# Patient Record
Sex: FEMALE | Race: WHITE | NOT HISPANIC OR LATINO | Employment: FULL TIME | URBAN - METROPOLITAN AREA
[De-identification: names, ages, dates, MRNs, and addresses within clinical notes are randomized per-mention and may not be internally consistent; named-entity substitution may affect disease eponyms.]

---

## 2023-08-16 ENCOUNTER — OFFICE VISIT (OUTPATIENT)
Dept: FAMILY MEDICINE CLINIC | Facility: CLINIC | Age: 58
End: 2023-08-16
Payer: COMMERCIAL

## 2023-08-16 VITALS
RESPIRATION RATE: 18 BRPM | HEART RATE: 84 BPM | DIASTOLIC BLOOD PRESSURE: 82 MMHG | TEMPERATURE: 97.1 F | SYSTOLIC BLOOD PRESSURE: 134 MMHG | OXYGEN SATURATION: 95 % | WEIGHT: 226 LBS

## 2023-08-16 DIAGNOSIS — Z00.00 ANNUAL PHYSICAL EXAM: ICD-10-CM

## 2023-08-16 DIAGNOSIS — Z12.11 ENCOUNTER FOR SCREENING COLONOSCOPY: ICD-10-CM

## 2023-08-16 DIAGNOSIS — Z11.59 NEED FOR HEPATITIS C SCREENING TEST: ICD-10-CM

## 2023-08-16 DIAGNOSIS — Z12.31 ENCOUNTER FOR SCREENING MAMMOGRAM FOR BREAST CANCER: Primary | ICD-10-CM

## 2023-08-16 DIAGNOSIS — Z78.0 ASYMPTOMATIC POSTMENOPAUSAL STATE: ICD-10-CM

## 2023-08-16 PROCEDURE — 99386 PREV VISIT NEW AGE 40-64: CPT | Performed by: FAMILY MEDICINE

## 2023-08-16 NOTE — PROGRESS NOTES
[facility]    NAME: Stanton Islas  AGE: 59 y.o. SEX: female  : 1965     DATE: 2023     Assessment and Plan:     Problem List Items Addressed This Visit    None  Visit Diagnoses     Encounter for screening mammogram for breast cancer    -  Primary    Encounter for screening colonoscopy        Relevant Orders    Cologuard    Annual physical exam        Relevant Orders    Lipid panel    CBC and Platelet    Hemoglobin A1C    HIV 1/2 AB/AG w Reflex [facility] for 2 yr old and above    Hepatitis C antibody    Vitamin D 25 hydroxy    Comprehensive metabolic panel    Need for hepatitis C screening test        Relevant Orders    Hepatitis C antibody    Asymptomatic postmenopausal state        Relevant Orders    DXA bone density spine hip and pelvis          Immunizations and preventive care screenings were discussed with patient today. Appropriate education was printed on patient's after visit summary. Counseling:  Alcohol/drug use: discussed moderation in alcohol intake, the recommendations for healthy alcohol use, and avoidance of illicit drug use. Dental Health: discussed importance of regular tooth brushing, flossing, and dental visits. Injury prevention: discussed safety/seat belts, safety helmets, smoke detectors, carbon dioxide detectors, and smoking near bedding or upholstery. Sexual health: discussed sexually transmitted diseases, partner selection, use of condoms, avoidance of unintended pregnancy, and contraceptive alternatives. Exercise: the importance of regular exercise/physical activity was discussed. Recommend exercise 3-5 times per week for at least 30 minutes. Return in 1 year (on 2024).      Chief Complaint:     Chief Complaint   Patient presents with   • New Patient Visit     Established care   • Physical Exam      History of Present Illness:     Adult Annual Physical   Patient here for a comprehensive physical exam. The patient reports no problems. Diet and Physical Activity  Diet/Nutrition: well balanced diet. Exercise: no formal exercise. Depression Screening  PHQ-2/9 Depression Screening    Little interest or pleasure in doing things: 0 - not at all  Feeling down, depressed, or hopeless: 0 - not at all  PHQ-2 Score: 0  PHQ-2 Interpretation: Negative depression screen       General Health  Sleep: sleeps poorly. Hearing: normal - bilateral.  Vision: no vision problems and previous LASIK surgery. Dental: regular dental visits. /GYN Health  Patient is: postmenopausal  Last menstrual period: hysterectomy 15y ago  Contraceptive method: n/a. Review of Systems:     Review of Systems   All other systems reviewed and are negative. Past Medical History:     History reviewed. No pertinent past medical history. Past Surgical History:     Past Surgical History:   Procedure Laterality Date   • HYSTERECTOMY      patient states 15 yrs ago      Social History:     Social History     Socioeconomic History   • Marital status: /Civil Union     Spouse name: None   • Number of children: None   • Years of education: None   • Highest education level: None   Occupational History   • None   Tobacco Use   • Smoking status: Never     Passive exposure: Never   • Smokeless tobacco: Never   Vaping Use   • Vaping Use: Never used   Substance and Sexual Activity   • Alcohol use: Not Currently   • Drug use: Never   • Sexual activity: None   Other Topics Concern   • None   Social History Narrative   • None     Social Determinants of Health     Financial Resource Strain: Not on file   Food Insecurity: Not on file   Transportation Needs: Not on file   Physical Activity: Not on file   Stress: Not on file   Social Connections: Not on file   Intimate Partner Violence: Not on file   Housing Stability: Not on file      Family History:     History reviewed. No pertinent family history.    Current Medications:     No current outpatient medications on file. No current facility-administered medications for this visit. Allergies: Allergies   Allergen Reactions   • Poison Ivy Extract Swelling      Physical Exam:     /82 (BP Location: Left arm, Patient Position: Sitting, Cuff Size: Large)   Pulse 84   Temp (!) 97.1 °F (36.2 °C) (Temporal)   Resp 18   Wt 103 kg (226 lb)   LMP  (LMP Unknown)   SpO2 95%     Physical Exam  Vitals reviewed. Constitutional:       General: She is not in acute distress. Appearance: She is well-developed. HENT:      Head: Normocephalic and atraumatic. Nose: Nose normal.      Mouth/Throat:      Mouth: Mucous membranes are moist.      Pharynx: Oropharynx is clear. Eyes:      Conjunctiva/sclera: Conjunctivae normal.   Cardiovascular:      Rate and Rhythm: Normal rate and regular rhythm. Heart sounds: Normal heart sounds. No murmur heard. Pulmonary:      Effort: Pulmonary effort is normal. No respiratory distress. Breath sounds: Normal breath sounds. Abdominal:      General: Abdomen is flat. Palpations: Abdomen is soft. Tenderness: There is no abdominal tenderness. Musculoskeletal:         General: No swelling. Cervical back: Neck supple. Skin:     General: Skin is warm and dry. Capillary Refill: Capillary refill takes less than 2 seconds. Neurological:      Mental Status: She is alert.    Psychiatric:         Mood and Affect: Mood normal.         Behavior: [place]  [address] [facility],[address]

## 2023-08-30 LAB
EXTERNAL HIV SCREEN: NORMAL
HBA1C MFR BLD HPLC: 5.6 %
HCV AB SER-ACNC: NORMAL

## 2023-09-07 ENCOUNTER — TELEPHONE (OUTPATIENT)
Dept: FAMILY MEDICINE CLINIC | Facility: CLINIC | Age: 58
End: 2023-09-07

## 2023-09-07 DIAGNOSIS — R19.5 POSITIVE COLORECTAL CANCER SCREENING USING COLOGUARD TEST: Primary | ICD-10-CM

## 2023-09-07 LAB — COLOGUARD RESULT REPORTABLE: POSITIVE

## 2023-09-07 NOTE — TELEPHONE ENCOUNTER
----- Message from Ann Murray DO sent at 9/7/2023 10:13 AM EDT -----  Cologuard test was positive which means she needs to have a colonoscopy. I have placed a referral for GI who should be contacting her to schedule.

## 2024-07-10 ENCOUNTER — APPOINTMENT (EMERGENCY)
Dept: CT IMAGING | Facility: HOSPITAL | Age: 59
End: 2024-07-10
Payer: COMMERCIAL

## 2024-07-10 ENCOUNTER — HOSPITAL ENCOUNTER (EMERGENCY)
Facility: HOSPITAL | Age: 59
Discharge: HOME | End: 2024-07-10
Attending: EMERGENCY MEDICINE
Payer: COMMERCIAL

## 2024-07-10 VITALS
DIASTOLIC BLOOD PRESSURE: 69 MMHG | HEART RATE: 66 BPM | TEMPERATURE: 98.1 F | SYSTOLIC BLOOD PRESSURE: 154 MMHG | OXYGEN SATURATION: 98 % | RESPIRATION RATE: 18 BRPM

## 2024-07-10 DIAGNOSIS — R42 VERTIGO: Primary | ICD-10-CM

## 2024-07-10 DIAGNOSIS — R03.0 ELEVATED BLOOD PRESSURE READING: ICD-10-CM

## 2024-07-10 DIAGNOSIS — G44.209 ACUTE NON INTRACTABLE TENSION-TYPE HEADACHE: ICD-10-CM

## 2024-07-10 DIAGNOSIS — R42 DIZZINESS: ICD-10-CM

## 2024-07-10 LAB
ALBUMIN SERPL BCG-MCNC: 4.5 G/DL (ref 3.5–5)
ALP SERPL-CCNC: 62 U/L (ref 34–104)
ALT SERPL W P-5'-P-CCNC: 22 U/L (ref 7–52)
ANION GAP SERPL CALCULATED.3IONS-SCNC: 7 MMOL/L (ref 4–13)
AST SERPL W P-5'-P-CCNC: 15 U/L (ref 13–39)
BASOPHILS # BLD AUTO: 0.02 THOUSANDS/ÂΜL (ref 0–0.1)
BASOPHILS NFR BLD AUTO: 0 % (ref 0–1)
BILIRUB SERPL-MCNC: 0.4 MG/DL (ref 0.2–1)
BUN SERPL-MCNC: 15 MG/DL (ref 5–25)
CALCIUM SERPL-MCNC: 9.8 MG/DL (ref 8.4–10.2)
CARDIAC TROPONIN I PNL SERPL HS: 4 NG/L
CHLORIDE SERPL-SCNC: 103 MMOL/L (ref 96–108)
CO2 SERPL-SCNC: 30 MMOL/L (ref 21–32)
CREAT SERPL-MCNC: 0.71 MG/DL (ref 0.6–1.3)
EOSINOPHIL # BLD AUTO: 0.05 THOUSAND/ÂΜL (ref 0–0.61)
EOSINOPHIL NFR BLD AUTO: 1 % (ref 0–6)
ERYTHROCYTE [DISTWIDTH] IN BLOOD BY AUTOMATED COUNT: 13.1 % (ref 11.6–15.1)
GFR SERPL CREATININE-BSD FRML MDRD: 92 ML/MIN/1.73SQ M
GLUCOSE SERPL-MCNC: 182 MG/DL (ref 65–140)
HCT VFR BLD AUTO: 42.6 % (ref 34.8–46.1)
HGB BLD-MCNC: 13.9 G/DL (ref 11.5–15.4)
IMM GRANULOCYTES # BLD AUTO: 0.05 THOUSAND/UL (ref 0–0.2)
IMM GRANULOCYTES NFR BLD AUTO: 1 % (ref 0–2)
LYMPHOCYTES # BLD AUTO: 1.61 THOUSANDS/ÂΜL (ref 0.6–4.47)
LYMPHOCYTES NFR BLD AUTO: 18 % (ref 14–44)
MCH RBC QN AUTO: 29.1 PG (ref 26.8–34.3)
MCHC RBC AUTO-ENTMCNC: 32.6 G/DL (ref 31.4–37.4)
MCV RBC AUTO: 89 FL (ref 82–98)
MONOCYTES # BLD AUTO: 0.46 THOUSAND/ÂΜL (ref 0.17–1.22)
MONOCYTES NFR BLD AUTO: 5 % (ref 4–12)
NEUTROPHILS # BLD AUTO: 7.03 THOUSANDS/ÂΜL (ref 1.85–7.62)
NEUTS SEG NFR BLD AUTO: 75 % (ref 43–75)
NRBC BLD AUTO-RTO: 0 /100 WBCS
PLATELET # BLD AUTO: 273 THOUSANDS/UL (ref 149–390)
PMV BLD AUTO: 10 FL (ref 8.9–12.7)
POTASSIUM SERPL-SCNC: 3.7 MMOL/L (ref 3.5–5.3)
PROT SERPL-MCNC: 7.7 G/DL (ref 6.4–8.4)
RBC # BLD AUTO: 4.77 MILLION/UL (ref 3.81–5.12)
SODIUM SERPL-SCNC: 140 MMOL/L (ref 135–147)
WBC # BLD AUTO: 9.22 THOUSAND/UL (ref 4.31–10.16)

## 2024-07-10 PROCEDURE — 99284 EMERGENCY DEPT VISIT MOD MDM: CPT

## 2024-07-10 PROCEDURE — 70496 CT ANGIOGRAPHY HEAD: CPT

## 2024-07-10 PROCEDURE — 36415 COLL VENOUS BLD VENIPUNCTURE: CPT

## 2024-07-10 PROCEDURE — 84484 ASSAY OF TROPONIN QUANT: CPT | Performed by: EMERGENCY MEDICINE

## 2024-07-10 PROCEDURE — 70498 CT ANGIOGRAPHY NECK: CPT

## 2024-07-10 PROCEDURE — 80053 COMPREHEN METABOLIC PANEL: CPT

## 2024-07-10 PROCEDURE — 85025 COMPLETE CBC W/AUTO DIFF WBC: CPT

## 2024-07-10 PROCEDURE — 93005 ELECTROCARDIOGRAM TRACING: CPT

## 2024-07-10 PROCEDURE — 99285 EMERGENCY DEPT VISIT HI MDM: CPT | Performed by: EMERGENCY MEDICINE

## 2024-07-10 PROCEDURE — 96374 THER/PROPH/DIAG INJ IV PUSH: CPT

## 2024-07-10 RX ORDER — KETOROLAC TROMETHAMINE 30 MG/ML
15 INJECTION, SOLUTION INTRAMUSCULAR; INTRAVENOUS ONCE
Status: COMPLETED | OUTPATIENT
Start: 2024-07-10 | End: 2024-07-10

## 2024-07-10 RX ORDER — AMLODIPINE BESYLATE 5 MG/1
5 TABLET ORAL ONCE
Status: COMPLETED | OUTPATIENT
Start: 2024-07-10 | End: 2024-07-10

## 2024-07-10 RX ORDER — ACETAMINOPHEN 325 MG/1
650 TABLET ORAL ONCE
Status: COMPLETED | OUTPATIENT
Start: 2024-07-10 | End: 2024-07-10

## 2024-07-10 RX ORDER — AMLODIPINE BESYLATE 5 MG/1
5 TABLET ORAL DAILY
Qty: 30 TABLET | Refills: 0 | Status: SHIPPED | OUTPATIENT
Start: 2024-07-10 | End: 2024-07-17 | Stop reason: SDUPTHER

## 2024-07-10 RX ORDER — MECLIZINE HCL 12.5 MG/1
25 TABLET ORAL ONCE
Status: COMPLETED | OUTPATIENT
Start: 2024-07-10 | End: 2024-07-10

## 2024-07-10 RX ADMIN — ACETAMINOPHEN 650 MG: 325 TABLET, FILM COATED ORAL at 13:23

## 2024-07-10 RX ADMIN — IOHEXOL 70 ML: 350 INJECTION, SOLUTION INTRAVENOUS at 11:27

## 2024-07-10 RX ADMIN — MECLIZINE 25 MG: 12.5 TABLET ORAL at 10:38

## 2024-07-10 RX ADMIN — AMLODIPINE BESYLATE 5 MG: 5 TABLET ORAL at 13:24

## 2024-07-10 RX ADMIN — KETOROLAC TROMETHAMINE 15 MG: 30 INJECTION, SOLUTION INTRAMUSCULAR; INTRAVENOUS at 13:23

## 2024-07-10 NOTE — ED ATTENDING ATTESTATION
7/10/2024  I, Tanvi Colón MD, saw and evaluated the patient. I have discussed the patient with the resident/non-physician practitioner and agree with the resident's/non-physician practitioner's findings, Plan of Care, and MDM as documented in the resident's/non-physician practitioner's note, except where noted. All available labs and Radiology studies were reviewed.  I was present for key portions of any procedure(s) performed by the resident/non-physician practitioner and I was immediately available to provide assistance.       At this point I agree with the current assessment done in the Emergency Department.  I have conducted an independent evaluation of this patient a history and physical is as follows:    59-year-old previously healthy female presenting for evaluation of dizziness, nausea, and headache.  Patient states that she woke up at around midnight lying on her left side.  When she laid back in bed she had sudden onset of sensation of the room spinning that lasted for about 3 minutes after which she ambulated to the bathroom and laid on the cold tile floor.  Over the next couple of hours she had recurrent sensation of the room spinning that would occur whenever she would get up and lie back in bed.  She subsequently developed a gradual in onset moderate in intensity frontal throbbing headache without vision changes, chest pain, shortness of breath, or palpitations.  She denies any focal weakness or numbness, dysarthria, or facial droop.  She is no longer experiencing a sensation of the room spinning but does feel lightheaded with persistent headache.  She has been able to ambulate without assistance.  Denies ringing in her ears or hearing loss.    Physical Exam  Vitals and nursing note reviewed.   Constitutional:       General: She is not in acute distress.     Appearance: She is well-developed. She is not ill-appearing, toxic-appearing or diaphoretic.   HENT:      Head: Normocephalic and  atraumatic.      Right Ear: External ear normal.      Left Ear: External ear normal.      Nose: Nose normal.   Eyes:      Extraocular Movements: Extraocular movements intact.      Conjunctiva/sclera: Conjunctivae normal.      Pupils: Pupils are equal, round, and reactive to light.   Cardiovascular:      Rate and Rhythm: Normal rate and regular rhythm.      Pulses: Normal pulses.      Heart sounds: Normal heart sounds. No murmur heard.     No friction rub. No gallop.   Pulmonary:      Effort: Pulmonary effort is normal. No respiratory distress.      Breath sounds: Normal breath sounds. No wheezing or rales.   Abdominal:      General: Bowel sounds are normal. There is no distension.      Palpations: Abdomen is soft.      Tenderness: There is no abdominal tenderness. There is no guarding.   Musculoskeletal:         General: No deformity. Normal range of motion.      Cervical back: Normal range of motion and neck supple.      Right lower leg: No edema.      Left lower leg: No edema.   Skin:     General: Skin is warm and dry.   Neurological:      Mental Status: She is alert and oriented to person, place, and time.      Motor: No abnormal muscle tone.      Comments: Face symmetric, tongue midline, 5/5 strength in the proximal and distal upper and lower extremities bilaterally with intact sensation to light touch throughout.  CN II-XII intact. Normal finger-to-nose, rapid alternating movements, and heel-to-shin bilaterally.  Normal speech.  When first standing the patient stumbled to the side but then caught herself.  Once her dizziness passed she was able to ambulate back and forth across the exam room 10+ times with a steady gait without assistance.  No ataxia or persistent dizziness.  No pronator drift.      Psychiatric:         Mood and Affect: Mood normal.           ED Course  ED Course as of 07/10/24 1501   Wed Jul 10, 2024   1017 Patient comes in after waking up with dizziness that she describes as a sensation of  room spinning and lightheadedness that occurred when waking up on her left side and then getting up from bed.  The sensation of movement lasted for approximately 3 minutes and then resolved.  She got up from bed, walked into the bathroom, laid on the floor, at which point she developed a gradual in onset moderate in severity headache that is since improved.  She continues to feel lightheaded but denies any further sensation of movement.  She did say that the sensation of movement recurred several times when she would change positions but then would stop after several minutes.  She reports nausea without vomiting.  No vision changes.  She had difficulty walking when she was encountering the symptom of the room spinning but is able to walk without assistance when that feeling is not present.  Denies any hearing loss, ringing in the ears, focal weakness or sensory deficits.   1019 NIH stroke scale is 0 on arrival.  When ambulating, patient was initially unsteady immediately when standing however then is able to ambulate back-and-forth across her exam room 10 times without any unsteadiness or assistance.  She has normal finger-nose, rapid alternating movements, and heel-to-shin bilaterally.  I have a very low suspicion for central etiology of her dizziness at this time as it is episodic with normal neurologic exam so will not activate stroke alert.  As the patient is newly hypertensive, however, will obtain CTA of the head and neck without contrast for further evaluation.  Meclizine to be ordered and will assess for improvement.  Blood pressure is already improved from 194/94 to 182/76 without intervention.  Will hold off on further antihypertensive agents unless pressure begins to trend back up.   1124 I personally interpreted the patient's EKG which reveals normal rate, normal sinus rhythm, left axis deviation, normal intervals, T wave inversion in lead III, nonspecific T wave abnormality in lead V2, no ST segment  deviation.  Patient denies chest pain or shortness of breath.  Troponin obtained in the setting of restratification with hypertension and abnormal EKG that is 4, doubt ACS.   1234 CT of the head and neck with fibromuscular dysplasia to the mid cervical segment of the right ICA, no other acute findings.  Will assess for improvement in symptoms.   1313 Patient feels symptomatically improved, is no longer dizzy or lightheaded but continues to have a 2 out of 10 headache in a bandlike distribution.  BP still elevated to 164/70.  Will start on low-dose amlodipine, 5 mg, administered Tylenol and Toradol.  If headache is improved and she remains otherwise asymptomatic can be discharged with follow-up with her PCP.  Will recommend daily blood pressure check at home, keeping a log of her blood pressures, and following up with her doctor in 1 week for blood pressure recheck and adjustment of her meds at that time if BP remains elevated.  She has no other signs of endorgan damage on workup to suggest hypertensive crisis.  I suspect the episode of sensation of the room spinning that occurred with turning over in bed this morning and quickly resolved was due to positional vertigo, headache and lightheadedness likely in the setting of hypertension.  She is not ataxic with ambulation, neuroexam remains normal, doubt central etiology such as stroke as the cause of her symptoms.   1436 BP improved to 154/69.    1500 Headache improved. Patient discharged in good condition.          Critical Care Time  Procedures

## 2024-07-10 NOTE — ED PROVIDER NOTES
"History  Chief Complaint   Patient presents with    Dizziness     Woke from sleep at midnight with dizziness, headache, n/v. Pt states room spinning. Difficulty walking. Denies hx of vertigo.  LKW 2130.       Dizziness  59-year-old female with no known past medical history presents for 10 hours of dizziness lightheadedness and nausea with emesis.  The patient states that she awoke with the room spinning and her eyes \"beating back-and-forth\" in a horizontal direction.  The patient states that she got up from her bed and then her dizziness improved.  However, she spent the next 3 hours with multiple episodes of vomiting.  The patient notes that she feels mildly unsteady on her feet but is able to ambulate without assistance.  Patient denies previous headache, dizziness or nausea leading up to this episode.    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    None       History reviewed. No pertinent past medical history.    Past Surgical History:   Procedure Laterality Date    HYSTERECTOMY      patient states 15 yrs ago       History reviewed. No pertinent family history.  I have reviewed and agree with the history as documented.    E-Cigarette/Vaping    E-Cigarette Use Never User      E-Cigarette/Vaping Substances    Nicotine No     THC No     CBD No     Flavoring No     Other No     Unknown No      Social History     Tobacco Use    Smoking status: Never     Passive exposure: Never    Smokeless tobacco: Never   Vaping Use    Vaping status: Never Used   Substance Use Topics    Alcohol use: Not Currently    Drug use: Never        Review of Systems   Neurological:  Positive for dizziness.       Physical Exam  ED Triage Vitals   Temperature Pulse Respirations Blood Pressure SpO2   07/10/24 0916 07/10/24 0913 07/10/24 0913 07/10/24 0913 07/10/24 0913   98.1 °F (36.7 °C) 76 18 (!) 194/94 98 %      Temp Source Heart Rate Source Patient Position - Orthostatic " VS BP Location FiO2 (%)   07/10/24 0916 07/10/24 0913 07/10/24 0913 07/10/24 0913 --   Oral Monitor Sitting Right arm       Pain Score       --                    Orthostatic Vital Signs  Vitals:    07/10/24 0913 07/10/24 1000   BP: (!) 194/94 (!) 182/76   Pulse: 76 72   Patient Position - Orthostatic VS: Sitting        Physical Exam  GENERAL APPEARANCE:  AxOx4, generally well-appearing Female, no acute distress.  HEENT:  NC, AT. MMM. EOMI, clear conjunctiva, oropharynx clear.  NECK:  Supple without lymphadenopathy.  No stiffness or restricted ROM.  HEART:  Normal rate and regular rhythm, normal S2, systolic blowing murmur hiding S1.  LUNGS:  CTAB, moving air well. No crackles or wheezes are heard.  ABDOMEN:  Soft, nontender, nondistended with good bowel sounds heard.  BACK: No CVAT, no obvious deformity.  EXTREMITIES:  Without cyanosis, clubbing or edema.  NEUROLOGICAL:  A&OX4. Face symmetric, tongue midline. CN II-XII intact. 5/5 strength in bilateral upper and lower extremities with intact sensation to light touch throughout. Normal finger-to-nose, heel-to-shin, and rapid alternating movements bilaterally. No pronator drift. Normal speech. Initially unsteady, but gait normalized as she ambulated.  Skin:  Warm and dry without any rash.    ED Medications  Medications - No data to display    Diagnostic Studies  Results Reviewed       Procedure Component Value Units Date/Time    CBC and differential [[id number]]     Lab Status: No result Specimen: Blood     Comprehensive metabolic panel [[id number]]     Lab Status: No result Specimen: Blood                    CTA head and neck with and without contrast    (Results Pending)         Procedures  Procedures      ED Course                                       Medical Decision Making  Amount and/or Complexity of Data Reviewed  Labs: ordered.  Radiology: ordered.    59-year-old female with PMH of murmur presents for tender history of dizziness headache lightheadedness and  unsteadiness.  This patient's presentation is most consistent with a peripheral cause like BPPV.  No history of recent infection so doubt vestibular neuritis.  History is also not consistent with Ménière's disease.  No history of trauma or fall.  No red flag features for central vertical to include gradual onset, vertical/bidirectional or nonfatigable nystagmus or focal neurologic findings on exam (including inability to ambulate, ataxia, dysmetria).  Presentation is also not consistent with an acute CNS infection, vestibular basilar artery insufficiency, cerebral hemorrhage or infarction, intracranial mass or bleed.    CT scan showed no sign of any acute intracranial abnormality, but did show signs of fibromuscular dysplasia in the right ICA.    Tylenol, Toradol and meclizine showed improvement in the patient's symptoms.  She also had no unsteadiness on her feet with walking a second time.    Discussed the findings of the CT with the patient and she will be following up with her primary care provider in regards to her CT results and blood pressure.  Will be sending a short course of amlodipine for blood pressure control and will talk to her primary care about continuation of this medication. Counseled the patient to return if any symptoms of severe nausea, severe dizziness or seizures present.      Disposition  Final diagnoses:   None     ED Disposition       None          Follow-up Information    None         Patient's Medications    No medications on file     No discharge procedures on file.    PDMP Review       None             ED Provider  Attending physically available and evaluated Allison Kothari. I managed the patient along with the ED Attending.    Electronically Signed by           Prakash Ureña DO  07/10/24 1414       Prakash Ureña DO  07/10/24 1410

## 2024-07-11 LAB
ATRIAL RATE: 67 BPM
P AXIS: 50 DEGREES
PR INTERVAL: 182 MS
QRS AXIS: 4 DEGREES
QRSD INTERVAL: 80 MS
QT INTERVAL: 388 MS
QTC INTERVAL: 409 MS
T WAVE AXIS: 16 DEGREES
VENTRICULAR RATE: 67 BPM

## 2024-07-11 PROCEDURE — 93010 ELECTROCARDIOGRAM REPORT: CPT | Performed by: INTERNAL MEDICINE

## 2024-07-17 ENCOUNTER — OFFICE VISIT (OUTPATIENT)
Dept: FAMILY MEDICINE CLINIC | Facility: CLINIC | Age: 59
End: 2024-07-17
Payer: COMMERCIAL

## 2024-07-17 VITALS
RESPIRATION RATE: 18 BRPM | OXYGEN SATURATION: 96 % | DIASTOLIC BLOOD PRESSURE: 86 MMHG | BODY MASS INDEX: 31.5 KG/M2 | SYSTOLIC BLOOD PRESSURE: 130 MMHG | HEART RATE: 79 BPM | WEIGHT: 225 LBS | TEMPERATURE: 98 F | HEIGHT: 71 IN

## 2024-07-17 DIAGNOSIS — R03.0 ELEVATED BLOOD PRESSURE READING: ICD-10-CM

## 2024-07-17 DIAGNOSIS — H81.10 BENIGN PAROXYSMAL POSITIONAL VERTIGO, UNSPECIFIED LATERALITY: Primary | ICD-10-CM

## 2024-07-17 PROCEDURE — 99214 OFFICE O/P EST MOD 30 MIN: CPT | Performed by: FAMILY MEDICINE

## 2024-07-17 RX ORDER — AMLODIPINE BESYLATE 5 MG/1
5 TABLET ORAL DAILY
Qty: 100 TABLET | Refills: 3 | Status: SHIPPED | OUTPATIENT
Start: 2024-07-17

## 2024-07-17 RX ORDER — MECLIZINE HCL 12.5 MG/1
12.5 TABLET ORAL EVERY 8 HOURS PRN
Qty: 30 TABLET | Refills: 0 | Status: SHIPPED | OUTPATIENT
Start: 2024-07-17

## 2024-07-17 NOTE — ASSESSMENT & PLAN NOTE
Likely due to middle ear effusion - recommend Sudafed for 3 days to dry her out and vestibular therapy. Can use Meclizine prn dizziness. Continue auricular massage and pinnae traction. Tylenol or Ibuprofen for associated headache. Follow up as needed.

## 2024-07-17 NOTE — ASSESSMENT & PLAN NOTE
Well controlled now on Amlodipine 5 mg since her ED visit for vertigo. Will continue on this regimen and monitor at home with arm cuff. If she continues to be at goal will follow up in 6 months. If elevated will follow up sooner.

## 2024-07-17 NOTE — PROGRESS NOTES
Ambulatory Visit  Name: Allison Kothari      : 1965      MRN: [id number]  Encounter Provider: Ernst Guadarrama DO  Encounter Date: 2024   Encounter department: [facility], [place]    Assessment & Plan   1. Benign paroxysmal positional vertigo, unspecified laterality  Assessment & Plan:  Likely due to middle ear effusion - recommend Sudafed for 3 days to dry her out and vestibular therapy. Can use Meclizine prn dizziness. Continue auricular massage and pinnae traction. Tylenol or Ibuprofen for associated headache. Follow up as needed.  Orders:  -     Ambulatory Referral to Physical Therapy; Future  -     meclizine (ANTIVERT) 12.5 MG tablet; Take 1 tablet (12.5 mg total) by mouth every 8 (eight) hours as needed for dizziness  2. Elevated blood pressure reading  Assessment & Plan:  Well controlled now on Amlodipine 5 mg since her ED visit for vertigo. Will continue on this regimen and monitor at home with arm cuff. If she continues to be at goal will follow up in 6 months. If elevated will follow up sooner.  Orders:  -     amLODIPine (NORVASC) 5 mg tablet; Take 1 tablet (5 mg total) by mouth daily       History of Present Illness     HPI  Patient here for ED follow up. She was seen in the ED 7 days ago for dizziness/room spinning with N/V, consistent with BPPV. CT showed no acute intracranial abnormalities but did show fibromuscular dysplasia in the right ICA. Her BP was elevated in the ED at 194/94 so was started on Amlodipine 5 mg daily and recommended to monitor at home.     Home monitoring is at goal. Takes Amlodipine in the afternoon, will switch to morning and continue.    Today still having headache and dizziness with laying flat on her back, can only lay comfortably on her right side. Headache is improved with pinnae traction especially on the left side.    Review of Systems    Objective     /86 (BP Location: Left arm, Patient Position: Sitting, Cuff Size: Large)   Pulse 79   Temp  "98 °F (36.7 °C) (Temporal)   Resp 18   Ht 5' 11\" (1.803 m)   Wt 102 kg (225 lb)   LMP  (LMP Unknown)   SpO2 96%   BMI 31.38 kg/m²     Physical Exam  HENT:      Head: Normocephalic and atraumatic.      Right Ear: Tympanic membrane, ear canal and external ear normal.      Left Ear: Tympanic membrane, ear canal and external ear normal.      Ears:      Comments: L > R middle ear effusion  Eyes:      Extraocular Movements: Extraocular movements intact.      Conjunctiva/sclera: Conjunctivae normal.   Cardiovascular:      Rate and Rhythm: Normal rate and regular rhythm.   Pulmonary:      Effort: Pulmonary effort is normal.   Psychiatric:         Mood and Affect: Mood normal.         Behavior: Behavior normal.       Administrative Statements           "

## 2024-07-31 ENCOUNTER — EVALUATION (OUTPATIENT)
Dept: PHYSICAL THERAPY | Facility: REHABILITATION | Age: 59
End: 2024-07-31
Payer: COMMERCIAL

## 2024-07-31 DIAGNOSIS — H81.10 BENIGN PAROXYSMAL POSITIONAL VERTIGO, UNSPECIFIED LATERALITY: Primary | ICD-10-CM

## 2024-07-31 PROCEDURE — 97162 PT EVAL MOD COMPLEX 30 MIN: CPT

## 2024-07-31 PROCEDURE — 97110 THERAPEUTIC EXERCISES: CPT

## 2024-07-31 NOTE — PROGRESS NOTES
PT Evaluation     Today's date: 2024  Patient name: Allison Kothari  : 1965  MRN: [id number]  Referring provider: Ernst Guadarrama DO  Dx:   Encounter Diagnosis     ICD-10-CM    1. Benign paroxysmal positional vertigo, unspecified laterality  H81.10 Ambulatory Referral to Physical Therapy          Start Time: 0940  Stop Time: 1015  Total time in clinic (min): 35 minutes    Assessment  Impairments: activity intolerance, impaired balance and lacks appropriate home exercise program  Symptom irritability: moderate    Assessment details:   Allison Kothari is a pleasant 59 y.o. female who presents with acute dizziness/vertigo symptoms. No neuro deficits. Symptoms appear to BPPV in nature despite unremarkable exam today. Symptoms are slowly improving but unresolved at this time. Patient has a Dizziness Handicap Inventory (DHI) score of 42 indicating moderate handicap. The impairments listed above are resulting in reduced QoL and fear of worsening of symptoms. No further referral appears necessary at this time based upon examination results.  I expect she will improve in the next 2-4 weeks.        Understanding of Dx/Px/POC: good     Prognosis: good    Goals  Short Term Goals (by discharge):  1. Decreased intensity and frequency of symptoms by 75%  2. Patient will have a score of 26 or less on the DHI  3. GROC >90%  4. Patient will be independent with self management of symptoms via HEP     Plan  Patient would benefit from: skilled physical therapy    Planned therapy interventions: graded exercise, functional ROM exercises, flexibility, gait training, graded activity, home exercise program, therapeutic training, therapeutic exercise, therapeutic activities, stretching, strengthening, patient education, neuromuscular re-education, nerve gliding, behavior modification, balance, activity modification, manual therapy, IASTM and joint mobilization    Treatment plan discussed with: patient          Dizziness  subjective:  Patient presents to PT with acute onset of dizziness that started around 2 weeks. Patient reports she was awoken at night to room-spinning dizziness that was very intense and associated with nausea and headaches. Patient went to ED and all the workup came back negative for significant pathology. Patient reports she did have fluid in her ears. Patient reports currently she is still experiencing the symptoms on a daily basis. Patient is unable to lay on her left side in bed due to symptoms. Patient is able to lay on her back and right side if she is elevated. Patient was placed on some BP medications due to her elevated medications. Patient has no previous history of dizziness.     Dizziness Symptom Intensity  Average: 3-4/10  Worst: 10/10    Red Flags  Diplopia (-), Dysphagia (-), Dysarthria (-), Dizziness (+), Drop Attacks (-), Facial Numbness (-), Nystagmus (-), Nausea/Vomiting (+), Gait Ataxia (-)      Objective  Cervical Spine Examination:  Sharp renato: -  Alar ligament stability test: -  Modified vertebrobasilar insufficiency test: -  Spurling's test: -    UQS  No focal deficits    LQS  No focal deficits      Vestibular Oculomotor Screening:  Smooth pursuit: WNL  Vertical Saccades: WNL  Horizontal Saccades WNL  Convergence: impaired  Distance: > 6 cm    VOR (Horizontal): WNL  VOR (Vertical): WNL  VORc: WNL  Head Thrust: WNL    Positional testing  [place]-Hallpike: negative bilaterally for nystagmus and/or symptoms  Roll Test: negative bilaterally for nystagmus and/or symptoms              Precautions:       Manuals 7/31                                                                Neuro Re-Ed                                                                                                        Ther Ex                                                                                                                     Ther Activity                                       Gait Training                                        Modalities

## 2024-08-08 ENCOUNTER — OFFICE VISIT (OUTPATIENT)
Dept: PHYSICAL THERAPY | Facility: REHABILITATION | Age: 59
End: 2024-08-08
Payer: COMMERCIAL

## 2024-08-08 DIAGNOSIS — H81.10 BENIGN PAROXYSMAL POSITIONAL VERTIGO, UNSPECIFIED LATERALITY: Primary | ICD-10-CM

## 2024-08-08 PROCEDURE — 97140 MANUAL THERAPY 1/> REGIONS: CPT

## 2024-08-08 PROCEDURE — 97110 THERAPEUTIC EXERCISES: CPT

## 2024-08-08 NOTE — PROGRESS NOTES
Daily Note     Today's date: 2024  Patient name: Allison Kothari  : 1965  MRN: [id number]  Referring provider: Ernst Guadarrama DO  Dx:   Encounter Diagnosis     ICD-10-CM    1. Benign paroxysmal positional vertigo, unspecified laterality  H81.10           Start Time: 1015  Stop Time: 1040  Total time in clinic (min): 25 minutes    Subjective: Patient reports her dizziness/vertigo symptoms have significantly improved. States she gets about 3 episodes of very brief dizziness randomly which resolves within seconds. States the pressure in her ears has improved and is primarily in the left ear.       Objective: See treatment diary below  Vestibular Oculomotor Screening:  Smooth pursuit: WNL  Vertical Saccades: WNL  Horizontal Saccades WNL  Convergence: WNL  Distance: < 6cm    VOR (Horizontal): WNL  VOR (Vertical): WNL  VORc: WNL  Head Thrust: WNL    Positional testing  [place]-Hallpike: negative bilaterally without nystagmus  Roll Test: negative bilaterally without nystagmus      Assessment:   BPPV/vertigo seem to have resolved at this time based on vestibulo-oculomotor testing. Suspect the residual brief dizziness is related to the middle pressure/effusion. Patient  will f/u with PCP at this time      Plan: Follow up with Dr. Guadarrama at this time.      Precautions:       Manuals                                                   Re-Assessment  PRR           Neuro Re-Ed                                                                                                        Ther Ex             Pt Edu  PRR                                                                                                      Ther Activity                                       Gait Training                                       Modalities

## 2024-08-14 ENCOUNTER — NURSE TRIAGE (OUTPATIENT)
Age: 59
End: 2024-08-14

## 2024-08-14 NOTE — TELEPHONE ENCOUNTER
"Patient called in with blood pressure concerns. Patient states BP is \"spiking\" late morning early afternoon. She can tell BP is high because she has discomfort in neck and headache. Currently while on phone /92 and 116/82- 5 minutes apart.  Monday 158/101 and 115/78 readings. She has been taking her Amlodipine in morning-7:30am  She denies Chest pain, SOB. She still reports mild left ear pain and left nasal cavity pressure. She was discharged from Physical therapy for vertigo. OV 8/15/24 10 am. Patient will bring her BP equipment with her.    Reason for Disposition   Systolic BP >= 160 OR Diastolic >= 100    Answer Assessment - Initial Assessment Questions  1. BLOOD PRESSURE: \"What is the blood pressure?\" \"Did you take at least two measurements 5 minutes apart?\"      124/92 wrist cuff  2. ONSET: \"When did you take your blood pressure?\"      now  3. HOW: \"How did you obtain the blood pressure?\" (e.g., visiting nurse, automatic home BP monitor)      Wrist cuff  4. HISTORY: \"Do you have a history of high blood pressure?\"      yes  5. MEDICATIONS: \"Are you taking any medications for blood pressure?\" \"Have you missed any doses recently?\"      Amlodipine   6. OTHER SYMPTOMS: \"Do you have any symptoms?\" (e.g., headache, chest pain, blurred vision, difficulty breathing, weakness)      *No Answer*  7. PREGNANCY: \"Is there any chance you are pregnant?\" \"When was your last menstrual period?\"      Post-menopausal    Protocols used: Blood Pressure - High-ADULT-[place]    "

## 2024-08-15 ENCOUNTER — OFFICE VISIT (OUTPATIENT)
Dept: FAMILY MEDICINE CLINIC | Facility: CLINIC | Age: 59
End: 2024-08-15
Payer: COMMERCIAL

## 2024-08-15 VITALS
HEIGHT: 71 IN | BODY MASS INDEX: 31.88 KG/M2 | SYSTOLIC BLOOD PRESSURE: 142 MMHG | OXYGEN SATURATION: 98 % | WEIGHT: 227.69 LBS | HEART RATE: 78 BPM | DIASTOLIC BLOOD PRESSURE: 94 MMHG | TEMPERATURE: 97.5 F | RESPIRATION RATE: 18 BRPM

## 2024-08-15 DIAGNOSIS — R03.0 ELEVATED BLOOD PRESSURE READING: ICD-10-CM

## 2024-08-15 DIAGNOSIS — H81.10 BENIGN PAROXYSMAL POSITIONAL VERTIGO, UNSPECIFIED LATERALITY: ICD-10-CM

## 2024-08-15 DIAGNOSIS — R19.5 POSITIVE COLORECTAL CANCER SCREENING USING COLOGUARD TEST: ICD-10-CM

## 2024-08-15 DIAGNOSIS — G44.209 TENSION HEADACHE: Primary | ICD-10-CM

## 2024-08-15 DIAGNOSIS — I10 PRIMARY HYPERTENSION: ICD-10-CM

## 2024-08-15 PROCEDURE — 96372 THER/PROPH/DIAG INJ SC/IM: CPT

## 2024-08-15 PROCEDURE — 99214 OFFICE O/P EST MOD 30 MIN: CPT | Performed by: FAMILY MEDICINE

## 2024-08-15 RX ORDER — AMLODIPINE BESYLATE 5 MG/1
10 TABLET ORAL DAILY
Qty: 100 TABLET | Refills: 3 | Status: SHIPPED | OUTPATIENT
Start: 2024-08-15 | End: 2024-08-15 | Stop reason: SDUPTHER

## 2024-08-15 RX ORDER — KETOROLAC TROMETHAMINE 30 MG/ML
30 INJECTION, SOLUTION INTRAMUSCULAR; INTRAVENOUS ONCE
Status: COMPLETED | OUTPATIENT
Start: 2024-08-15 | End: 2024-08-15

## 2024-08-15 RX ORDER — AMLODIPINE BESYLATE 10 MG/1
10 TABLET ORAL DAILY
Qty: 100 TABLET | Refills: 3 | Status: SHIPPED | OUTPATIENT
Start: 2024-08-15

## 2024-08-15 RX ADMIN — KETOROLAC TROMETHAMINE 30 MG: 30 INJECTION, SOLUTION INTRAMUSCULAR; INTRAVENOUS at 10:48

## 2024-08-15 NOTE — ASSESSMENT & PLAN NOTE
Toradol given today. Recommend Ibuprofen 600 mg as needed. Will provide neck exercises to alleviate muscle tension. Ensure adequate hydration and sleep, stress management. CTA H/N showed normal sinuses so not likely contributing. Elevated BP could be a factor so will increase Amlodipine to 10 mg and continue monitoring.

## 2024-08-15 NOTE — PROGRESS NOTES
Ambulatory Visit  Name: Allison Kothari      : 1965      MRN: [id number]  Encounter Provider: Ernst Guadarrama DO  Encounter Date: 8/15/2024   Encounter department: [facility]    Assessment & Plan   1. Tension headache  Assessment & Plan:  Toradol given today. Recommend Ibuprofen 600 mg as needed. Will provide neck exercises to alleviate muscle tension. Ensure adequate hydration and sleep, stress management. CTA H/N showed normal sinuses so not likely contributing. Elevated BP could be a factor so will increase Amlodipine to 10 mg and continue monitoring.  Orders:  -     ketorolac (TORADOL) injection 30 mg  2. Elevated blood pressure reading  Assessment & Plan:  Increase Amlodipine from 5 to 10 mg daily given persistently elevated BP on home monitoring and in the office. Follow up in 2-4 weeks with home log.  Orders:  -     amLODIPine (NORVASC) 10 mg tablet; Take 1 tablet (10 mg total) by mouth daily  3. Benign paroxysmal positional vertigo, unspecified laterality  Assessment & Plan:  Symptoms improved since PT. Still has trouble while driving.   4. Primary hypertension  Assessment & Plan:  Increase Amlodipine from 5 to 10 mg daily given persistently elevated BP on home monitoring and in the office. Follow up in 2-4 weeks with home log.  5. Positive colorectal cancer screening using Cologuard test  Assessment & Plan:  Patient was provided a referral to GI nearly a year ago, was not contacted nor did she reach out to them to schedule. Referral provided again with phone number and encouraged her to follow up with colonoscopy. Patient understands and agreeable.  Orders:  -     Ambulatory Referral to Gastroenterology; Future       History of Present Illness     HPI    Here for BP follow up. Monitoring at home, mostly elevated.     Vertigo symptoms improved with PT. Still having L ear pressure and pain. Pain in the L sinuses as well in the last few days. Not congested, no URI symptoms. Still  "having headaches. BP above goal, 150s/100, having headaches with elevated pressures. Had a snack, waited 30 minutes, then was 118/78. 124/102, 124/100. Improves after sitting for 30 minutes.    Feels like she's in a fog with constant headache.     Review of Systems    Objective     /94 (BP Location: Left arm, Patient Position: Sitting, Cuff Size: Large)   Pulse 78   Temp 97.5 °F (36.4 °C) (Temporal)   Resp 18   Ht 5' 11\" (1.803 m)   Wt 103 kg (227 lb 11 oz)   LMP  (LMP Unknown)   SpO2 98%   BMI 31.76 kg/m²     Physical Exam  Vitals reviewed.   Constitutional:       Appearance: Normal appearance.   Cardiovascular:      Rate and Rhythm: Normal rate and regular rhythm.      Heart sounds: Normal heart sounds.   Pulmonary:      Effort: Pulmonary effort is normal.   Abdominal:      General: Abdomen is flat.   Skin:     General: Skin is warm and dry.   Neurological:      General: No focal deficit present.      Mental Status: She is alert and oriented to person, place, and time.   Psychiatric:         Mood and Affect: Mood normal.         Behavior: Behavior normal.       Administrative Statements           "

## 2024-08-15 NOTE — ASSESSMENT & PLAN NOTE
Increase Amlodipine from 5 to 10 mg daily given persistently elevated BP on home monitoring and in the office. Follow up in 2-4 weeks with home log.

## 2024-08-15 NOTE — ASSESSMENT & PLAN NOTE
Patient was provided a referral to GI nearly a year ago, was not contacted nor did she reach out to them to schedule. Referral provided again with phone number and encouraged her to follow up with colonoscopy. Patient understands and agreeable.

## 2024-08-21 ENCOUNTER — OFFICE VISIT (OUTPATIENT)
Dept: FAMILY MEDICINE CLINIC | Facility: CLINIC | Age: 59
End: 2024-08-21
Payer: COMMERCIAL

## 2024-08-21 VITALS
HEART RATE: 92 BPM | TEMPERATURE: 97.8 F | HEIGHT: 71 IN | BODY MASS INDEX: 31.5 KG/M2 | WEIGHT: 225 LBS | SYSTOLIC BLOOD PRESSURE: 130 MMHG | OXYGEN SATURATION: 99 % | DIASTOLIC BLOOD PRESSURE: 60 MMHG | RESPIRATION RATE: 18 BRPM

## 2024-08-21 DIAGNOSIS — H81.10 BENIGN PAROXYSMAL POSITIONAL VERTIGO, UNSPECIFIED LATERALITY: ICD-10-CM

## 2024-08-21 DIAGNOSIS — R19.5 POSITIVE COLORECTAL CANCER SCREENING USING COLOGUARD TEST: ICD-10-CM

## 2024-08-21 DIAGNOSIS — Z00.00 ANNUAL PHYSICAL EXAM: ICD-10-CM

## 2024-08-21 DIAGNOSIS — B02.9 HERPES ZOSTER WITHOUT COMPLICATION: Primary | ICD-10-CM

## 2024-08-21 DIAGNOSIS — I10 PRIMARY HYPERTENSION: ICD-10-CM

## 2024-08-21 PROCEDURE — 99396 PREV VISIT EST AGE 40-64: CPT | Performed by: FAMILY MEDICINE

## 2024-08-21 PROCEDURE — 99214 OFFICE O/P EST MOD 30 MIN: CPT | Performed by: FAMILY MEDICINE

## 2024-08-21 RX ORDER — VALACYCLOVIR HYDROCHLORIDE 1 G/1
1000 TABLET, FILM COATED ORAL 3 TIMES DAILY
Qty: 21 TABLET | Refills: 0 | Status: SHIPPED | OUTPATIENT
Start: 2024-08-21 | End: 2024-08-28

## 2024-08-21 NOTE — ASSESSMENT & PLAN NOTE
Has not yet scheduled with GI and positive cologuard was over a year ago. Encouraged her to call GI to schedule as new referral was placed and she has not heard from them yet.

## 2024-08-21 NOTE — PROGRESS NOTES
Adult Annual Physical  Name: Allison Kothari      : 1965      MRN: [id number]  Encounter Provider: Ernst Guadarrama DO  Encounter Date: 2024   Encounter department: [facility]    Assessment & Plan   1. Herpes zoster without complication  -     valACYclovir (VALTREX) 1,000 mg tablet; Take 1 tablet (1,000 mg total) by mouth 3 (three) times a day for 7 days  2. Annual physical exam  -     Lipid panel; Future  -     Hemoglobin A1C; Future  -     Vitamin D 25 hydroxy; Future  3. Primary hypertension  Assessment & Plan:  BP at goal on increased dose of Amlodipine, now on 10 mg daily. Continue home monitoring. Follow up as needed.  4. Benign paroxysmal positional vertigo, unspecified laterality  Assessment & Plan:  Symptoms improved.  5. Positive colorectal cancer screening using Cologuard test  Assessment & Plan:  Has not yet scheduled with GI and positive cologuard was over a year ago. Encouraged her to call GI to schedule as new referral was placed and she has not heard from them yet.  Immunizations and preventive care screenings were discussed with patient today. Appropriate education was printed on patient's after visit summary.    Counseling:  Alcohol/drug use: discussed moderation in alcohol intake, the recommendations for healthy alcohol use, and avoidance of illicit drug use.  Dental Health: discussed importance of regular tooth brushing, flossing, and dental visits.  Sexual health: discussed sexually transmitted diseases, partner selection, use of condoms, avoidance of unintended pregnancy, and contraceptive alternatives.  Exercise: the importance of regular exercise/physical activity was discussed. Recommend exercise 3-5 times per week for at least 30 minutes.          History of Present Illness       Has a rash, was painful before onset, improving in the last 5 days. Thinks it might be shingles.    BPPV symptoms improved, still having some headaches but mild, relieved by  "Ibuprofen, sometimes only take 1-2 tablets, will increase to 2-3 tablets.     BP improved today on current regimen of increased Amlodipine. Monitoring at home and much improved, mostly at goal.    Tension headaches improved. Now allowed to work from home permanently instead of driving long distance to new office.    Will call GI to set up colonoscopy for positive cologuard last year.      Adult Annual Physical:  Patient presents for annual physical.     Diet and Physical Activity:  - Diet/Nutrition: well balanced diet.  - Exercise: no formal exercise and walking.    General Health:  - Sleep: sleeps well.  - Hearing: normal hearing bilateral ears.  - Vision: goes for regular eye exams.  - Dental: regular dental visits.    Review of Systems  Current Outpatient Medications on File Prior to Visit   Medication Sig Dispense Refill   • amLODIPine (NORVASC) 10 mg tablet Take 1 tablet (10 mg total) by mouth daily 100 tablet 3   • meclizine (ANTIVERT) 12.5 MG tablet Take 1 tablet (12.5 mg total) by mouth every 8 (eight) hours as needed for dizziness 30 tablet 0     No current facility-administered medications on file prior to visit.      Social History     Tobacco Use   • Smoking status: Never     Passive exposure: Never   • Smokeless tobacco: Never   Vaping Use   • Vaping status: Never Used   Substance and Sexual Activity   • Alcohol use: Not Currently   • Drug use: Never   • Sexual activity: Yes     Partners: Male       Objective     /60 (BP Location: Left arm, Patient Position: Sitting, Cuff Size: Large)   Pulse 92   Temp 97.8 °F (36.6 °C) (Temporal)   Resp 18   Ht 5' 11\" (1.803 m)   Wt 102 kg (225 lb)   LMP  (LMP Unknown)   SpO2 99%   BMI 31.38 kg/m²     Physical Exam  Vitals reviewed.   Constitutional:       Appearance: Normal appearance.   HENT:      Mouth/Throat:      Mouth: Mucous membranes are moist.      Pharynx: Oropharynx is clear.   Eyes:      Extraocular Movements: Extraocular movements intact.     "  Conjunctiva/sclera: Conjunctivae normal.   Cardiovascular:      Rate and Rhythm: Normal rate and regular rhythm.      Heart sounds: Normal heart sounds.   Pulmonary:      Effort: Pulmonary effort is normal.      Breath sounds: Normal breath sounds.   Neurological:      Mental Status: She is alert.   Psychiatric:         Mood and Affect: Mood normal.         Behavior: Behavior normal.

## 2024-08-21 NOTE — ASSESSMENT & PLAN NOTE
BP at goal on increased dose of Amlodipine, now on 10 mg daily. Continue home monitoring. Follow up as needed.

## 2024-08-23 ENCOUNTER — APPOINTMENT (OUTPATIENT)
Dept: LAB | Facility: CLINIC | Age: 59
End: 2024-08-23
Payer: COMMERCIAL

## 2024-08-23 DIAGNOSIS — Z00.00 ANNUAL PHYSICAL EXAM: ICD-10-CM

## 2024-08-23 LAB
25(OH)D3 SERPL-MCNC: 73.5 NG/ML (ref 30–100)
CHOLEST SERPL-MCNC: 228 MG/DL
EST. AVERAGE GLUCOSE BLD GHB EST-MCNC: 123 MG/DL
HBA1C MFR BLD: 5.9 %
HDLC SERPL-MCNC: 46 MG/DL
LDLC SERPL CALC-MCNC: 152 MG/DL (ref 0–100)
NONHDLC SERPL-MCNC: 182 MG/DL
TRIGL SERPL-MCNC: 149 MG/DL

## 2024-08-23 PROCEDURE — 82306 VITAMIN D 25 HYDROXY: CPT

## 2024-08-23 PROCEDURE — 83036 HEMOGLOBIN GLYCOSYLATED A1C: CPT

## 2024-08-23 PROCEDURE — 36415 COLL VENOUS BLD VENIPUNCTURE: CPT

## 2024-08-23 PROCEDURE — 80061 LIPID PANEL: CPT

## 2024-09-26 ENCOUNTER — PREP FOR PROCEDURE (OUTPATIENT)
Dept: GASTROENTEROLOGY | Facility: CLINIC | Age: 59
End: 2024-09-26

## 2024-09-26 ENCOUNTER — TELEPHONE (OUTPATIENT)
Dept: GASTROENTEROLOGY | Facility: CLINIC | Age: 59
End: 2024-09-26

## 2024-09-26 DIAGNOSIS — R19.5 POSITIVE COLORECTAL CANCER SCREENING USING COLOGUARD TEST: Primary | ICD-10-CM

## 2024-09-26 NOTE — TELEPHONE ENCOUNTER
Scheduled date of colonoscopy (as of today): 11/7/24  Physician performing colonoscopy: tyree  Location of colonoscopy: [facility]  Bowel prep reviewed with patient: m/d  Instructions reviewed with patient by: emailed  Clearances: none   0 0 0 0

## 2024-09-26 NOTE — TELEPHONE ENCOUNTER
09/26/24  Screened by: Anna Baeza MA    Referring Provider + colsara    Pre- Screening:     There is no height or weight on file to calculate BMI.  Has patient been referred for a routine screening Colonoscopy? yes  Is the patient between 45-75 years old? yes      Previous Colonoscopy no   If yes:    Date:     Facility:     Reason:       SCHEDULING STAFF: If the patient is between 45yrs-49yrs, please advise patient to confirm benefits/coverage with their insurance company for a routine screening colonoscopy, some insurance carriers will only cover at 50yrs or older. If the patient is over 75years old, please schedule an office visit.     Does the patient want to see a Gastroenterologist prior to their procedure OR are they having any GI symptoms? no    Has the patient been hospitalized or had abdominal surgery in the past 6 months? no    Does the patient use supplemental oxygen? no    Does the patient take Coumadin, Lovenox, Plavix, Elliquis, Xarelto, or other blood thinning medication? no    Has the patient had a stroke, cardiac event, or stent placed in the past year? no    SCHEDULING STAFF: If patient answers NO to above questions, then schedule procedure. If patient answers YES to above questions, then schedule office appointment.     If patient is between 45yrs - 49yrs, please advise patient that we will have to confirm benefits & coverage with their insurance company for a routine screening colonoscopy.

## 2024-10-17 ENCOUNTER — PATIENT MESSAGE (OUTPATIENT)
Dept: GASTROENTEROLOGY | Facility: CLINIC | Age: 59
End: 2024-10-17

## 2024-10-24 ENCOUNTER — ANESTHESIA EVENT (OUTPATIENT)
Dept: ANESTHESIOLOGY | Facility: HOSPITAL | Age: 59
End: 2024-10-24

## 2024-10-24 ENCOUNTER — ANESTHESIA (OUTPATIENT)
Dept: ANESTHESIOLOGY | Facility: HOSPITAL | Age: 59
End: 2024-10-24

## 2024-11-07 ENCOUNTER — ANESTHESIA (OUTPATIENT)
Dept: GASTROENTEROLOGY | Facility: AMBULARY SURGERY CENTER | Age: 59
End: 2024-11-07
Payer: COMMERCIAL

## 2024-11-07 ENCOUNTER — HOSPITAL ENCOUNTER (OUTPATIENT)
Dept: GASTROENTEROLOGY | Facility: AMBULARY SURGERY CENTER | Age: 59
Setting detail: OUTPATIENT SURGERY
End: 2024-11-07
Attending: INTERNAL MEDICINE
Payer: COMMERCIAL

## 2024-11-07 VITALS
WEIGHT: 220 LBS | HEIGHT: 69 IN | HEART RATE: 62 BPM | RESPIRATION RATE: 18 BRPM | DIASTOLIC BLOOD PRESSURE: 67 MMHG | OXYGEN SATURATION: 100 % | TEMPERATURE: 96.7 F | BODY MASS INDEX: 32.58 KG/M2 | SYSTOLIC BLOOD PRESSURE: 143 MMHG

## 2024-11-07 DIAGNOSIS — R19.5 POSITIVE COLORECTAL CANCER SCREENING USING COLOGUARD TEST: ICD-10-CM

## 2024-11-07 PROCEDURE — 45385 COLONOSCOPY W/LESION REMOVAL: CPT | Performed by: INTERNAL MEDICINE

## 2024-11-07 PROCEDURE — 88305 TISSUE EXAM BY PATHOLOGIST: CPT | Performed by: PATHOLOGY

## 2024-11-07 RX ORDER — LIDOCAINE HYDROCHLORIDE 10 MG/ML
INJECTION, SOLUTION EPIDURAL; INFILTRATION; INTRACAUDAL; PERINEURAL AS NEEDED
Status: DISCONTINUED | OUTPATIENT
Start: 2024-11-07 | End: 2024-11-07

## 2024-11-07 RX ORDER — SODIUM CHLORIDE, SODIUM LACTATE, POTASSIUM CHLORIDE, CALCIUM CHLORIDE 600; 310; 30; 20 MG/100ML; MG/100ML; MG/100ML; MG/100ML
INJECTION, SOLUTION INTRAVENOUS CONTINUOUS PRN
Status: DISCONTINUED | OUTPATIENT
Start: 2024-11-07 | End: 2024-11-07

## 2024-11-07 RX ORDER — PROPOFOL 10 MG/ML
INJECTION, EMULSION INTRAVENOUS AS NEEDED
Status: DISCONTINUED | OUTPATIENT
Start: 2024-11-07 | End: 2024-11-07

## 2024-11-07 RX ADMIN — PROPOFOL 50 MG: 10 INJECTION, EMULSION INTRAVENOUS at 10:53

## 2024-11-07 RX ADMIN — PROPOFOL 30 MG: 10 INJECTION, EMULSION INTRAVENOUS at 10:30

## 2024-11-07 RX ADMIN — SODIUM CHLORIDE, SODIUM LACTATE, POTASSIUM CHLORIDE, AND CALCIUM CHLORIDE: .6; .31; .03; .02 INJECTION, SOLUTION INTRAVENOUS at 10:23

## 2024-11-07 RX ADMIN — PROPOFOL 30 MG: 10 INJECTION, EMULSION INTRAVENOUS at 10:37

## 2024-11-07 RX ADMIN — LIDOCAINE HYDROCHLORIDE 50 MG: 10 INJECTION, SOLUTION EPIDURAL; INFILTRATION; INTRACAUDAL; PERINEURAL at 10:26

## 2024-11-07 RX ADMIN — PROPOFOL 100 MG: 10 INJECTION, EMULSION INTRAVENOUS at 10:26

## 2024-11-07 RX ADMIN — PROPOFOL 30 MG: 10 INJECTION, EMULSION INTRAVENOUS at 10:33

## 2024-11-07 RX ADMIN — PROPOFOL 50 MG: 10 INJECTION, EMULSION INTRAVENOUS at 10:48

## 2024-11-07 RX ADMIN — PROPOFOL 50 MG: 10 INJECTION, EMULSION INTRAVENOUS at 10:27

## 2024-11-07 RX ADMIN — PROPOFOL 30 MG: 10 INJECTION, EMULSION INTRAVENOUS at 10:56

## 2024-11-07 RX ADMIN — PROPOFOL 30 MG: 10 INJECTION, EMULSION INTRAVENOUS at 10:41

## 2024-11-07 RX ADMIN — PROPOFOL 30 MG: 10 INJECTION, EMULSION INTRAVENOUS at 10:29

## 2024-11-07 NOTE — H&P
"  [facility]  History & Physical    Patient Info:  Name: Allison Kothari   Age: 59 y.o.   YOB: 1965   MRN: [id number]    HPI: Allison Kothari is a 59 y.o. year old female who presents for colonoscopy for positive cologuard    REVIEW OF SYSTEMS: Per the HPI, and otherwise unremarkable.    Historical Information   No past medical history on file.  Past Surgical History:   Procedure Laterality Date    HYSTERECTOMY      patient states 15 yrs ago     Social History   Social History     Substance and Sexual Activity   Alcohol Use Not Currently     Social History     Substance and Sexual Activity   Drug Use Never     Social History     Tobacco Use   Smoking Status Never    Passive exposure: Never   Smokeless Tobacco Never     No family history on file.    MEDICATIONS & ALLERGIES:    Current Outpatient Medications   Medication Instructions    amLODIPine (NORVASC) 10 mg, Oral, Daily    meclizine (ANTIVERT) 12.5 mg, Oral, Every 8 hours PRN    valACYclovir (VALTREX) 1,000 mg, Oral, 3 times daily     Allergies   Allergen Reactions    Poison Ivy Extract Swelling       PHYSICAL EXAM:    Objective   Blood pressure (!) 177/81, pulse 85, temperature (!) 97 °F (36.1 °C), temperature source Temporal, resp. rate 18, height 5' 9\" (1.753 m), weight 99.8 kg (220 lb), SpO2 99%. Body mass index is 32.49 kg/m².    Gen: NAD  CV: RRR  CHEST: Clear  ABD: Soft, NT/ND  EXT: No edema    ASSESSMENT AND PLAN:  This is a 59 y.o. year old female here for colonoscopy, and she is stable and optimized for her procedure.      Clara Fernandes D.O.  Gastroenterology Fellow    Division of Gastroenterology & Hepatology  Available on [employer]    ** Please Note: This note is constructed using a voice recognition dictation system. **   "

## 2024-11-07 NOTE — ANESTHESIA PREPROCEDURE EVALUATION
Procedure:  COLONOSCOPY    Relevant Problems   ANESTHESIA   (-) History of anesthesia complications      CARDIO   (+) Primary hypertension   (-) Chest pain   (-) WILDER (dyspnea on exertion)      NEURO/PSYCH   (+) Tension headache      PULMONARY   (-) Shortness of breath   (-) Sleep apnea   (-) URI (upper respiratory infection)        Physical Exam    Airway    Mallampati score: II  TM Distance: >3 FB  Neck ROM: full     Dental       Cardiovascular      Pulmonary      Other Findings  post-pubertal.      Anesthesia Plan  ASA Score- 2     Anesthesia Type- IV sedation with anesthesia with ASA Monitors.         Additional Monitors:     Airway Plan:            Plan Factors-Exercise tolerance (METS): >4 METS.    Chart reviewed. EKG reviewed.  Existing labs reviewed. Patient summary reviewed.                  Induction- intravenous.    Postoperative Plan-         Informed Consent- Anesthetic plan and risks discussed with patient.  I personally reviewed this patient with the CRNA. Discussed and agreed on the Anesthesia Plan with the CRNA..

## 2024-11-07 NOTE — ANESTHESIA POSTPROCEDURE EVALUATION
Post-Op Assessment Note    CV Status:  Stable  Pain Score: 0    Pain management: adequate       Mental Status:  Alert and awake   Hydration Status:  Euvolemic   PONV Controlled:  Controlled   Airway Patency:  Patent     Post Op Vitals Reviewed: Yes    No anethesia notable event occurred.    Staff: Anesthesiologist, CRNA       Last Filed PACU Vitals:  Vitals Value Taken Time   Temp 96.7 °F (35.9 °C) 11/07/24 1102   Pulse 69 11/07/24 1102   /61 11/07/24 1102   Resp 18 11/07/24 1102   SpO2 100 % 11/07/24 1102       Modified Be:  Activity: 2 (11/7/2024 11:02 AM)  Respiration: 2 (11/7/2024 11:02 AM)  Circulation: 2 (11/7/2024 11:02 AM)  Consciousness: 1 (11/7/2024 11:02 AM)  Oxygen Saturation: 2 (11/7/2024 11:02 AM)  Modified Be Score: 9 (11/7/2024 11:02 AM)

## 2024-11-14 PROCEDURE — 88305 TISSUE EXAM BY PATHOLOGIST: CPT | Performed by: PATHOLOGY

## 2024-11-23 ENCOUNTER — RESULTS FOLLOW-UP (OUTPATIENT)
Dept: GASTROENTEROLOGY | Facility: CLINIC | Age: 59
End: 2024-11-23